# Patient Record
Sex: MALE | Race: WHITE | HISPANIC OR LATINO | Employment: FULL TIME | URBAN - METROPOLITAN AREA
[De-identification: names, ages, dates, MRNs, and addresses within clinical notes are randomized per-mention and may not be internally consistent; named-entity substitution may affect disease eponyms.]

---

## 2023-09-14 ENCOUNTER — NURSE TRIAGE (OUTPATIENT)
Dept: NURSING | Facility: CLINIC | Age: 28
End: 2023-09-14

## 2023-09-14 NOTE — TELEPHONE ENCOUNTER
"Pt reports \"rock climbing two days ago on campus at [employer].\"  \"Had a pretty hard fall.\"  \"Caught myself with my R arm.\"  \"Landed directly on my palm.  \"Shock of wrist and thumb, and all the way up the arm.\"    Applied ice x 24 hours.  Then heat x 24 hours.  Pain not severe.  Has taken 800 mg ibuprofen every 6-to-8 hours.  Able to sleep well overnight.    Injury occurred Sept 12th (48 hours ago).  Wrist and elbow have decreased motion.  \"Significant swelling around elbow.\"  \"Still hurting.\"  Pain involves R forearm (wrist to elbow).    Pt asks about [place] option.  Warm transferred to a  for an appointment now.  [phone] -> reached ortho .   will now check directly with [facility] staff now ....  Advised pt to accept any same-day appt found.  Otherwise should seek walk-in eval at services such as [facility].  Pt agrees to plan.    [facility] Nurse Advisor     Reason for Disposition   Can't move injured arm normally (bend or straighten completely)    Additional Information   Negative: Major bleeding (actively dripping or spurting) that can't be stopped   Negative: Serious injury with multiple fractures (broken bones)   Negative: Sounds like a life-threatening emergency to the triager   Negative: Wound looks infected   Negative: Arm pain from overuse (e.g., sports, lifting, physical work)   Negative: Arm pain not from an injury   Negative: Shoulder injury is main concern   Negative: Elbow injury is main concern   Negative: Hand or wrist injury is main concern   Negative: Bullet wound, stabbed by knife, or other serious penetrating wound   Negative: Looks like a broken bone or dislocated joint (crooked or deformed)   Negative: Can't move injured arm at all   Negative: Bleeding won't stop after 10 minutes of direct pressure (using correct technique)   Negative: Skin is split open or gaping (or length > 1/2 inch or 12 mm)   Negative: Dirt in " the wound and not removed after 15 minutes of scrubbing   Negative: Sounds like a serious injury to the triager   Negative: SEVERE pain    Protocols used: Arm Injury-A-OH

## 2023-09-15 ENCOUNTER — PRE VISIT (OUTPATIENT)
Dept: ORTHOPEDICS | Facility: CLINIC | Age: 28
End: 2023-09-15

## 2023-09-15 ENCOUNTER — OFFICE VISIT (OUTPATIENT)
Dept: ORTHOPEDICS | Facility: CLINIC | Age: 28
End: 2023-09-15
Payer: COMMERCIAL

## 2023-09-15 ENCOUNTER — ANCILLARY PROCEDURE (OUTPATIENT)
Dept: GENERAL RADIOLOGY | Facility: CLINIC | Age: 28
End: 2023-09-15
Attending: FAMILY MEDICINE
Payer: COMMERCIAL

## 2023-09-15 DIAGNOSIS — M79.601 RIGHT ARM PAIN: ICD-10-CM

## 2023-09-15 DIAGNOSIS — M79.601 RIGHT ARM PAIN: Primary | ICD-10-CM

## 2023-09-15 PROCEDURE — 73110 X-RAY EXAM OF WRIST: CPT | Mod: RT | Performed by: RADIOLOGY

## 2023-09-15 PROCEDURE — 99203 OFFICE O/P NEW LOW 30 MIN: CPT | Mod: GC | Performed by: FAMILY MEDICINE

## 2023-09-15 PROCEDURE — 73080 X-RAY EXAM OF ELBOW: CPT | Mod: RT | Performed by: RADIOLOGY

## 2023-09-15 NOTE — TELEPHONE ENCOUNTER
DIAGNOSIS: R wrist pain/R elbow has 90% rom/forearm issue/ortho con    APPOINTMENT DATE: 09/15/23   NOTES STATUS DETAILS   OFFICE NOTE from referring provider N/A Self

## 2023-09-15 NOTE — PROGRESS NOTES
Attending Note:   I have personally examined this patient /images and have reviewed the clinical presentation and progress note with the fellow. I agree with the treatment plan as outlined. The plan was formulated with the fellow on the day of the patient's visit. I have reviewed all imaging with the fellow and agree with the findings in the documentation.     Angy Pang MD, CAQ, CCD  [facility]  Sports Medicine and Bone Health

## 2023-09-15 NOTE — LETTER
9/15/2023      RE: Jere Trammell  [address]  [place] [address]     Dear Colleague,    Thank you for referring your patient, Jere Trammell, to the [facility] SPORTS MEDICINE CLINIC [place]. Please see a copy of my visit note below.      [facility]  Sports Medicine Clinic  Clinics and Surgery Center         SUBJECTIVE       Jere Trammell is a 28 year old male presenting to clinic today with concern of right elbow pain.     Background:   Date of injury: 9/12/23  Duration of symptoms: 4 days  Mechanism of Injury: FOOSH while bouldering, landed directly on right wrist.  Aggravating factors: General use of the wrist  Relieving Factors: Brace  Prior Evaluation: None    Jere explains that he has been bracing his right elbow which has provided some pain relief. Ibuprofen 800mg po tid was used for 48 hours and provided pain relief. Has been applying heat and cold with some improvement. 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 He is left handed. Has has been using his right elbow since the injury.     Denies any pain to the right wrist. Has good ROM and denies any associated bruising. No hx of prior trauma to the site.     PMH, Medications and Allergies were reviewed and updated as needed.    ROS:  As noted above otherwise negative.    There is no problem list on file for this patient.      No current outpatient medications on file.            OBJECTIVE:       Vitals: There were no vitals filed for this visit.  BMI: There is no height or weight on file to calculate BMI.    Gen:  Well nourished and in no acute distress  HEENT: Extraocular movement intact  Neck: Supple  Pulm:  Breathing Comfortably. No increased respiratory effort.  Psych: Euthymic. Appropriately answers questions    MSK:   MSK - Right WRIST/HAND  Inspection: No atrophy nor swelling  Sensation: intact in radial, medial, ulnar distribution   ROM:   Wrist: Full flexion,  extension, radial deviation, ulnar deviation, pronation, supination.  Hand: Full flexion at PIP/DIP, finger abduction/adduction.   Strength: 5/5 in all motions   Bony tenderness:   Wrist: Nttp Carpal bones, Snuffbox:   Hand: Nttp Metacarpals, Phalanges  Tendons: FDS/FDP/Extensor mechanism intact   Other: No nodules palpated in palmar aspect.     Right elbow:   Obvious effusion at the joint extending to mid humerus.  Decreased flexion and extension secondary to pain and swelling. Pain with supination and pronation.   Ecchymosis appreciated at the elbow.   No tenderness to palpation of the olecranon or radial head.     IMAGING: Final results and radiologist's interpretation available in the [facility] health record. Images were reviewed with the patient/family members in the office today.     My personal interpretation of the performed imaging is: an intra-articular fracture of the right radial head appreciated. Fracture is non-displaced. Joint effusion appreciated as there is a anterior and posterior sail sign. No olecranon fracture appreciated.           ASSESSMENT and PLAN:     Jere was seen today for pain.    Diagnoses and all orders for this visit:    Right arm pain  -     XR Elbow Right G/E 3 Views; Future  -     XR Wrist Right G/E 3 Views; Future      Jere presents to clinic today after a fall while boldering. Swelling appreciated at the right elbow and decreased ROM. Xray did show an intra-articular fracture of the right radial head appreciated. Fracture is non-displaced. Joint effusion appreciated as there is a anterior and posterior sail sign. No olecranon fracture appreciated.      Recommendations:   -Sling provided for patient to wear until his next visit on 9/21/2023  -Repeat xray on 9/21/2023   -He can engage in gentle ROM (flexion/extension) but recommend that he not use the right arm or try to supinate of pronate.   -Can use tylenol for pain relief  -Continue icing his elbow, explained that heat may make  swelling worse    Options for treatment and/or follow-up care were reviewed with the patient was actively involved in the decision making process. Patient verbalized understanding and was in agreement with the plan.    The patient was seen by and discussed with attending physician Dr.Suzanne KATIE Pang MD, CAQ, CCD, who agrees with the plan unless otherwise stated.     Rocio Graham  Primary Care Sports Medicine Fellow  [facility]      Attending Note:   I have personally examined this patient /images and have reviewed the clinical presentation and progress note with the fellow. I agree with the treatment plan as outlined. The plan was formulated with the fellow on the day of the patient's visit. I have reviewed all imaging with the fellow and agree with the findings in the documentation.     Angy Pang MD, CAQ, CCD  [facility]  Sports Medicine and Bone Health      Again, thank you for allowing me to participate in the care of your patient.      Sincerely,    Angy Pang MD

## 2023-09-15 NOTE — PROGRESS NOTES
[facility]  Sports Medicine Clinic  Clinics and Surgery Center         SUBJECTIVE       Jere Trammell is a 28 year old male presenting to clinic today with concern of right elbow pain.     Background:   Date of injury: 9/12/23  Duration of symptoms: 4 days  Mechanism of Injury: FOOSH while bouldering, landed directly on right wrist.  Aggravating factors: General use of the wrist  Relieving Factors: Brace  Prior Evaluation: None    Jere explains that he has been bracing his right elbow which has provided some pain relief. Ibuprofen 800mg po tid was used for 48 hours and provided pain relief. Has been applying heat and cold with some improvement. 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 He is left handed. Has has been using his right elbow since the injury.     Denies any pain to the right wrist. Has good ROM and denies any associated bruising. No hx of prior trauma to the site.     PMH, Medications and Allergies were reviewed and updated as needed.    ROS:  As noted above otherwise negative.    There is no problem list on file for this patient.      No current outpatient medications on file.            OBJECTIVE:       Vitals: There were no vitals filed for this visit.  BMI: There is no height or weight on file to calculate BMI.    Gen:  Well nourished and in no acute distress  HEENT: Extraocular movement intact  Neck: Supple  Pulm:  Breathing Comfortably. No increased respiratory effort.  Psych: Euthymic. Appropriately answers questions    MSK:   MSK - Right WRIST/HAND  Inspection: No atrophy nor swelling  Sensation: intact in radial, medial, ulnar distribution   ROM:   Wrist: Full flexion, extension, radial deviation, ulnar deviation, pronation, supination.  Hand: Full flexion at PIP/DIP, finger abduction/adduction.   Strength: 5/5 in all motions   Bony tenderness:   Wrist: Nttp Carpal bones, Snuffbox:   Hand: Nttp Metacarpals, Phalanges  Tendons: FDS/FDP/Extensor  mechanism intact   Other: No nodules palpated in palmar aspect.     Right elbow:   Obvious effusion at the joint extending to mid humerus.  Decreased flexion and extension secondary to pain and swelling. Pain with supination and pronation.   Ecchymosis appreciated at the elbow.   No tenderness to palpation of the olecranon or radial head.     IMAGING: Final results and radiologist's interpretation available in the [facility] health record. Images were reviewed with the patient/family members in the office today.     My personal interpretation of the performed imaging is: an intra-articular fracture of the right radial head appreciated. Fracture is non-displaced. Joint effusion appreciated as there is a anterior and posterior sail sign. No olecranon fracture appreciated.           ASSESSMENT and PLAN:     Jere was seen today for pain.    Diagnoses and all orders for this visit:    Right arm pain  -     XR Elbow Right G/E 3 Views; Future  -     XR Wrist Right G/E 3 Views; Future      Jere presents to clinic today after a fall while boldering. Swelling appreciated at the right elbow and decreased ROM. Xray did show an intra-articular fracture of the right radial head appreciated. Fracture is non-displaced. Joint effusion appreciated as there is a anterior and posterior sail sign. No olecranon fracture appreciated.      Recommendations:   -Sling provided for patient to wear until his next visit on 9/21/2023  -Repeat xray on 9/21/2023   -He can engage in gentle ROM (flexion/extension) but recommend that he not use the right arm or try to supinate of pronate.   -Can use tylenol for pain relief  -Continue icing his elbow, explained that heat may make swelling worse    Options for treatment and/or follow-up care were reviewed with the patient was actively involved in the decision making process. Patient verbalized understanding and was in agreement with the plan.    The patient was seen by and discussed with attending  physician Dr.Suzanne KATIE Pang MD, CAQ, CCD, who agrees with the plan unless otherwise stated.     Rocio Graham  Primary Care Sports Medicine Fellow  [facility]

## 2023-09-18 DIAGNOSIS — S42.401D CLOSED FRACTURE DISLOCATION OF RIGHT ELBOW WITH ROUTINE HEALING, SUBSEQUENT ENCOUNTER: Primary | ICD-10-CM

## 2023-09-21 ENCOUNTER — ANCILLARY PROCEDURE (OUTPATIENT)
Dept: GENERAL RADIOLOGY | Facility: CLINIC | Age: 28
End: 2023-09-21
Attending: STUDENT IN AN ORGANIZED HEALTH CARE EDUCATION/TRAINING PROGRAM
Payer: COMMERCIAL

## 2023-09-21 ENCOUNTER — OFFICE VISIT (OUTPATIENT)
Dept: ORTHOPEDICS | Facility: CLINIC | Age: 28
End: 2023-09-21
Payer: COMMERCIAL

## 2023-09-21 DIAGNOSIS — M65.4 DE QUERVAIN'S TENOSYNOVITIS, RIGHT: ICD-10-CM

## 2023-09-21 DIAGNOSIS — S52.124A CLOSED NONDISPLACED FRACTURE OF HEAD OF RIGHT RADIUS, INITIAL ENCOUNTER: Primary | ICD-10-CM

## 2023-09-21 DIAGNOSIS — S42.401D CLOSED FRACTURE DISLOCATION OF RIGHT ELBOW WITH ROUTINE HEALING, SUBSEQUENT ENCOUNTER: ICD-10-CM

## 2023-09-21 PROCEDURE — 73080 X-RAY EXAM OF ELBOW: CPT | Mod: RT | Performed by: RADIOLOGY

## 2023-09-21 PROCEDURE — 99213 OFFICE O/P EST LOW 20 MIN: CPT | Mod: GC | Performed by: FAMILY MEDICINE

## 2023-09-21 NOTE — LETTER
9/21/2023      RE: Jere Trammell  [address]  [place] [address]     Dear Colleague,    Thank you for referring your patient, Jere Trammell, to the [facility] SPORTS MEDICINE CLINIC [place]. Please see a copy of my visit note below.      [facility]  Sports Medicine Clinic  Clinics and Surgery Center         SUBJECTIVE       Jere Trammell is a 28 year old male presenting to clinic today for follow-up of R radial head fx.     Background:   Date of injury: 9/12/23  Mechanism of Injury: FOOSH while bouldering, landed directly on right wrist.    Since last visit on 9/18, patient has been using compression sleeve and sling unless bathing or relaxing and not moving at home. Occasional ice and tylenol for pain. Motion, pain and swelling have all greatly improved, feeling about 75% back to normal. Pain in elbow is more dull than sharp now, worse with any movement. Also having occasional sharp twinges of pain at wrist which seems like it is separate with certain movements or when loading wrist accidentally. No numbness/tingling.    PMH, Medications and Allergies were reviewed and updated as needed.    ROS:  As noted above otherwise negative.    There is no problem list on file for this patient.      No current outpatient medications on file.            OBJECTIVE:       Vitals: There were no vitals filed for this visit.  BMI: There is no height or weight on file to calculate BMI.    Gen:  Well nourished and in no acute distress  HEENT: Extraocular movement intact  Neck: Supple  Pulm:  Breathing Comfortably. No increased respiratory effort.  Psych: Euthymic. Appropriately answers questions    MSK:   MSK - Right WRIST/HAND/ELBOW  Inspection: Ongoing, but improved effusion of the R elbow  Sensation: intact in radial, medial, ulnar distribution   ROM:   Elbow: Minimally decreased (near full) flexion and extension secondary to pain and swelling. Pain with supination and  pronation.   Wrist: Full flexion, extension, radial deviation, ulnar deviation, pronation, supination. Pain in all directions.  Strength: 4/5 in all motions due to pain  Bony tenderness:   Elbow: No tenderness to palpation of the olecranon or radial head.  Wrist: TTP over 1st extensor tendons, NTTP Carpal bones, Snuffbox  Hand: Nttp Metacarpals, Phalanges  Tendons: FDS/FDP/Extensor mechanism intact    Other: No nodules palpated in palmar aspect.     Narrative & Impression   3 views right elbow radiographs 9/21/2023 1:39 PM     History: Closed fracture dislocation of right elbow with routine  healing, subsequent encounter     Comparison: 9/15/2023     Findings:     AP, oblique and lateral views of the right elbow were obtained.      Redemonstration intra-articular fracture of the radial head, similar  alignment. Fracture line remains visible. Decreased joint effusion  with posterior fat no longer visible.     No substantial degenerative change.                                                                      IMPRESSION:  1. Ongoing healing radial head fracture with persistent visualization  fracture line. Alignment unchanged.  2. Decreased joint effusion.     HOMER HUSAIN             ASSESSMENT and PLAN:     Jere was seen today for follow up.    Diagnoses and all orders for this visit:    Closed nondisplaced fracture of head of right radius, initial encounter    De Quervain's tenosynovitis, right    Jere presents to clinic for follow-up of R radial head fracture sustained on 9/12/23. Swelling, ROM, pain significantly improved. Xray today showed routine healing of non-displaced intra-articular fracture of the right radial head and improved joint effusion.     Recommendations:   -Sling while in public  -start gentle ROM in all directions   -tylenol, ice for pain/ swelling    Follow-up in 3 weeks or sooner if concerns.    Options for treatment and/or follow-up care were reviewed with the patient was  actively involved in the decision making process. Patient verbalized understanding and was in agreement with the plan.    The patient was seen by and discussed with attending physician Dr.Suzanne KATIE Pang MD, CAQ, CCD, who agrees with the plan unless otherwise stated.     Ayush Patel MD   Primary Care Sports Medicine Fellow  [facility]    Attending Note:   I have examined this patient/images and have reviewed the clinical presentation and progress note with the fellow. I agree with the treatment plan as outlined. The plan was formulated with the fellow on the day of the patient's visit. I have reviewed all imaging with the fellow and agree with the findings in the documentation.     Angy Pang MD, CAQ, CCD  [facility]  Sports Medicine and Bone Health      Again, thank you for allowing me to participate in the care of your patient.      Sincerely,    Ayush Patel MD

## 2023-09-21 NOTE — PROGRESS NOTES
Attending Note:   I have examined this patient/images and have reviewed the clinical presentation and progress note with the fellow. I agree with the treatment plan as outlined. The plan was formulated with the fellow on the day of the patient's visit. I have reviewed all imaging with the fellow and agree with the findings in the documentation.     Angy Pang MD, CAQ, CCD  [facility]  Sports Medicine and Bone Health

## 2023-09-21 NOTE — PROGRESS NOTES
[facility]  Sports Medicine Clinic  Clinics and Surgery Center         SUBJECTIVE       Jere Trammell is a 28 year old male presenting to clinic today for follow-up of R radial head fx.     Background:   Date of injury: 9/12/23  Mechanism of Injury: FOOSH while bouldering, landed directly on right wrist.    Since last visit on 9/18, patient has been using compression sleeve and sling unless bathing or relaxing and not moving at home. Occasional ice and tylenol for pain. Motion, pain and swelling have all greatly improved, feeling about 75% back to normal. Pain in elbow is more dull than sharp now, worse with any movement. Also having occasional sharp twinges of pain at wrist which seems like it is separate with certain movements or when loading wrist accidentally. No numbness/tingling.    PMH, Medications and Allergies were reviewed and updated as needed.    ROS:  As noted above otherwise negative.    There is no problem list on file for this patient.      No current outpatient medications on file.            OBJECTIVE:       Vitals: There were no vitals filed for this visit.  BMI: There is no height or weight on file to calculate BMI.    Gen:  Well nourished and in no acute distress  HEENT: Extraocular movement intact  Neck: Supple  Pulm:  Breathing Comfortably. No increased respiratory effort.  Psych: Euthymic. Appropriately answers questions    MSK:   MSK - Right WRIST/HAND/ELBOW  Inspection: Ongoing, but improved effusion of the R elbow  Sensation: intact in radial, medial, ulnar distribution   ROM:   Elbow: Minimally decreased (near full) flexion and extension secondary to pain and swelling. Pain with supination and pronation.   Wrist: Full flexion, extension, radial deviation, ulnar deviation, pronation, supination. Pain in all directions.  Strength: 4/5 in all motions due to pain  Bony tenderness:   Elbow: No tenderness to palpation of the olecranon or radial head.  Wrist: TTP over 1st  extensor tendons, NTTP Carpal bones, Snuffbox  Hand: Nttp Metacarpals, Phalanges  Tendons: FDS/FDP/Extensor mechanism intact    Other: No nodules palpated in palmar aspect.     Narrative & Impression   3 views right elbow radiographs 9/21/2023 1:39 PM     History: Closed fracture dislocation of right elbow with routine  healing, subsequent encounter     Comparison: 9/15/2023     Findings:     AP, oblique and lateral views of the right elbow were obtained.      Redemonstration intra-articular fracture of the radial head, similar  alignment. Fracture line remains visible. Decreased joint effusion  with posterior fat no longer visible.     No substantial degenerative change.                                                                      IMPRESSION:  1. Ongoing healing radial head fracture with persistent visualization  fracture line. Alignment unchanged.  2. Decreased joint effusion.     HOMER HUSAIN             ASSESSMENT and PLAN:     Jere was seen today for follow up.    Diagnoses and all orders for this visit:    Closed nondisplaced fracture of head of right radius, initial encounter    De Quervain's tenosynovitis, right    Jere presents to clinic for follow-up of R radial head fracture sustained on 9/12/23. Swelling, ROM, pain significantly improved. Xray today showed routine healing of non-displaced intra-articular fracture of the right radial head and improved joint effusion.     Recommendations:   -Sling while in public  -start gentle ROM in all directions   -tylenol, ice for pain/ swelling    Follow-up in 3 weeks or sooner if concerns.    Options for treatment and/or follow-up care were reviewed with the patient was actively involved in the decision making process. Patient verbalized understanding and was in agreement with the plan.    The patient was seen by and discussed with attending physician Dr.Suzanne KATIE Pang MD, CAQ, CCD, who agrees with the plan unless otherwise stated.     Ayush  MD Jorge   Primary Care Sports Medicine Fellow  [facility]

## 2023-10-10 DIAGNOSIS — M25.521 RIGHT ELBOW PAIN: Primary | ICD-10-CM

## 2023-10-10 DIAGNOSIS — S42.401D CLOSED FRACTURE DISLOCATION OF RIGHT ELBOW WITH ROUTINE HEALING, SUBSEQUENT ENCOUNTER: ICD-10-CM

## 2023-10-12 ENCOUNTER — ANCILLARY PROCEDURE (OUTPATIENT)
Dept: GENERAL RADIOLOGY | Facility: CLINIC | Age: 28
End: 2023-10-12
Attending: STUDENT IN AN ORGANIZED HEALTH CARE EDUCATION/TRAINING PROGRAM
Payer: COMMERCIAL

## 2023-10-12 ENCOUNTER — OFFICE VISIT (OUTPATIENT)
Dept: ORTHOPEDICS | Facility: CLINIC | Age: 28
End: 2023-10-12
Payer: COMMERCIAL

## 2023-10-12 DIAGNOSIS — M25.521 RIGHT ELBOW PAIN: ICD-10-CM

## 2023-10-12 DIAGNOSIS — S52.124A CLOSED NONDISPLACED FRACTURE OF HEAD OF RIGHT RADIUS, INITIAL ENCOUNTER: Primary | ICD-10-CM

## 2023-10-12 DIAGNOSIS — S42.401D CLOSED FRACTURE DISLOCATION OF RIGHT ELBOW WITH ROUTINE HEALING, SUBSEQUENT ENCOUNTER: ICD-10-CM

## 2023-10-12 PROCEDURE — 99213 OFFICE O/P EST LOW 20 MIN: CPT | Mod: GC | Performed by: FAMILY MEDICINE

## 2023-10-12 PROCEDURE — 73080 X-RAY EXAM OF ELBOW: CPT | Mod: RT | Performed by: RADIOLOGY

## 2023-10-12 NOTE — PROGRESS NOTES
[facility]  Sports Medicine Clinic  Clinics and Surgery Center         SUBJECTIVE       Jere Trammell is a 28 year old male presenting to clinic today for follow-up of R radial head fx.     Background:   Date of injury: 9/12/23  Mechanism of Injury: FOOSH while bouldering, landed directly on right wrist.    Since last visit on 9/21, patient has been using compression sleeve and sling unless bathing or relaxing and not moving at home. Occasional ice, otherwise not needing anything for pain. Motion, pain and swelling have all greatly improved, feeling about 85 to 90% back to normal. Pain in elbow is more dull and only at very extremes of motion.    PMH, Medications and Allergies were reviewed and updated as needed.    ROS:  As noted above otherwise negative.    There is no problem list on file for this patient.      No current outpatient medications on file.            OBJECTIVE:       Vitals: There were no vitals filed for this visit.  BMI: There is no height or weight on file to calculate BMI.    Gen:  Well nourished and in no acute distress  HEENT: Extraocular movement intact  Neck: Supple  Pulm:  Breathing Comfortably. No increased respiratory effort.  Psych: Euthymic. Appropriately answers questions    MSK:   MSK - Right WRIST/HAND/ELBOW  Inspection: mild effusion of the R elbow  Sensation: intact in radial, medial, ulnar distribution   ROM: Elbow/ wrist: Full flexion, extension, supination, pronation with minimal pain at extremes  Strength: 5/5 in all directions with 1/10 pain at extremes of resisted motion  Bony tenderness: 1/10 TTP over radial head  No tenderness to palpation of the olecranon, 1st extensor tendons, Carpal bones, Snuffbox Metacarpals, Phalanges  Tendons: FDS/FDP/Extensor mechanism intact      XRAY:  Narrative & Impression   3 views right elbow radiographs 10/12/2023 4:22 PM     History: AP/LAT/OBL; Right elbow pain; Closed fracture dislocation of  right elbow with routine  healing, subsequent encounter      Comparison: 9/21/2023     Findings:     AP, oblique and lateral views of the right elbow were obtained.      Healing intra-articular fracture of the radial head. Alignment  unchanged with minimal depression of the lateral fracture segment. No  joint effusion.     No substantial degenerative change.     Soft tissue is unremarkable.                                                                      Impression:  Intra-articular radial head fracture in stable alignment.     CHU OROZCO MD (Joe)             ASSESSMENT and PLAN:     Jere was seen today for pain.    Diagnoses and all orders for this visit:    Closed nondisplaced fracture of head of right radius, initial encounter  -     Calcium; Future  -     Vitamin D Deficiency; Future    Jere presents to clinic for follow-up of R radial head fracture sustained on 9/12/23.  Patient is 4.5 weeks from date of injury.  No change in fracture alignment on radiographs today, however somewhat limited evidence of bony healing compared to what we expect at this time.    Recommendations:   -Sling while in public   -gentle ROM  -tylenol, ice for pain/ swelling  -Consider calcium and vitamin D testing to ensure bony healing.  Patient will discuss cost with insurance prior to obtaining lab work.  If fracture is not further along by next visit would insist on this even more as it could contribute to delayed healing..    Follow-up in 2-3 weeks or sooner if concerns.    Options for treatment and/or follow-up care were reviewed with the patient was actively involved in the decision making process. Patient verbalized understanding and was in agreement with the plan.    The patient was seen by and discussed with attending physician Dr.Suzanne KATIE Pang MD, CAQ, CCD, who agrees with the plan unless otherwise stated.     Ayush Patel MD   Primary Care Sports Medicine Fellow  [facility]

## 2023-10-12 NOTE — LETTER
10/12/2023      RE: Jere Trammell  [address]  [place] [address]     Dear Colleague,    Thank you for referring your patient, Jere Trammell, to the [facility] SPORTS MEDICINE CLINIC [place]. Please see a copy of my visit note below.      [facility]  Sports Medicine Clinic  Clinics and Surgery Center         SUBJECTIVE       Jere Trammell is a 28 year old male presenting to clinic today for follow-up of R radial head fx.     Background:   Date of injury: 9/12/23  Mechanism of Injury: FOOSH while bouldering, landed directly on right wrist.    Since last visit on 9/21, patient has been using compression sleeve and sling unless bathing or relaxing and not moving at home. Occasional ice, otherwise not needing anything for pain. Motion, pain and swelling have all greatly improved, feeling about 85 to 90% back to normal. Pain in elbow is more dull and only at very extremes of motion.    PMH, Medications and Allergies were reviewed and updated as needed.    ROS:  As noted above otherwise negative.    There is no problem list on file for this patient.      No current outpatient medications on file.            OBJECTIVE:       Vitals: There were no vitals filed for this visit.  BMI: There is no height or weight on file to calculate BMI.    Gen:  Well nourished and in no acute distress  HEENT: Extraocular movement intact  Neck: Supple  Pulm:  Breathing Comfortably. No increased respiratory effort.  Psych: Euthymic. Appropriately answers questions    MSK:   MSK - Right WRIST/HAND/ELBOW  Inspection: mild effusion of the R elbow  Sensation: intact in radial, medial, ulnar distribution   ROM: Elbow/ wrist: Full flexion, extension, supination, pronation with minimal pain at extremes  Strength: 5/5 in all directions with 1/10 pain at extremes of resisted motion  Bony tenderness: 1/10 TTP over radial head  No tenderness to palpation of the olecranon, 1st extensor tendons,  Carpal bones, Snuffbox Metacarpals, Phalanges  Tendons: FDS/FDP/Extensor mechanism intact      XRAY:  Narrative & Impression   3 views right elbow radiographs 10/12/2023 4:22 PM     History: AP/LAT/OBL; Right elbow pain; Closed fracture dislocation of  right elbow with routine healing, subsequent encounter      Comparison: 9/21/2023     Findings:     AP, oblique and lateral views of the right elbow were obtained.      Healing intra-articular fracture of the radial head. Alignment  unchanged with minimal depression of the lateral fracture segment. No  joint effusion.     No substantial degenerative change.     Soft tissue is unremarkable.                                                                      Impression:  Intra-articular radial head fracture in stable alignment.     CHU (Andrew OROZCO MD             ASSESSMENT and PLAN:     Jere was seen today for pain.    Diagnoses and all orders for this visit:    Closed nondisplaced fracture of head of right radius, initial encounter  -     Calcium; Future  -     Vitamin D Deficiency; Future    Jere presents to clinic for follow-up of R radial head fracture sustained on 9/12/23.  Patient is 4.5 weeks from date of injury.  No change in fracture alignment on radiographs today, however somewhat limited evidence of bony healing compared to what we expect at this time.    Recommendations:   -Sling while in public   -gentle ROM  -tylenol, ice for pain/ swelling  -Consider calcium and vitamin D testing to ensure bony healing.  Patient will discuss cost with insurance prior to obtaining lab work.  If fracture is not further along by next visit would insist on this even more as it could contribute to delayed healing..    Follow-up in 2-3 weeks or sooner if concerns.    Options for treatment and/or follow-up care were reviewed with the patient was actively involved in the decision making process. Patient verbalized understanding and was in agreement with the  plan.    The patient was seen by and discussed with attending physician Dr.Suzanne KATIE Pang MD, CAQ, CCD, who agrees with the plan unless otherwise stated.     Ayush Patel MD   Primary Care Sports Medicine Fellow  [facility]    Attending Note:   I have examined this patient /images and have reviewed the clinical presentation and progress note with the fellow. I agree with the treatment plan as outlined. The plan was formulated with the fellow on the day of the patient's visit. I have reviewed all imaging with the fellow and agree with the findings in the documentation.     Angy Pang MD, CAQ, CCD  [facility]  Sports Medicine and Bone Health      Again, thank you for allowing me to participate in the care of your patient.      Sincerely,    Ayush Patel MD

## 2023-10-13 ENCOUNTER — LAB (OUTPATIENT)
Dept: LAB | Facility: CLINIC | Age: 28
End: 2023-10-13
Payer: COMMERCIAL

## 2023-10-13 DIAGNOSIS — S52.124A CLOSED NONDISPLACED FRACTURE OF HEAD OF RIGHT RADIUS, INITIAL ENCOUNTER: ICD-10-CM

## 2023-10-13 LAB
CALCIUM SERPL-MCNC: 9.3 MG/DL (ref 8.6–10)
VIT D+METAB SERPL-MCNC: 30 NG/ML (ref 20–50)

## 2023-10-13 PROCEDURE — 82306 VITAMIN D 25 HYDROXY: CPT | Performed by: FAMILY MEDICINE

## 2023-10-13 PROCEDURE — 82310 ASSAY OF CALCIUM: CPT | Performed by: PATHOLOGY

## 2023-10-13 PROCEDURE — 36415 COLL VENOUS BLD VENIPUNCTURE: CPT | Performed by: PATHOLOGY

## 2023-10-13 PROCEDURE — 99000 SPECIMEN HANDLING OFFICE-LAB: CPT | Performed by: PATHOLOGY

## 2023-10-14 NOTE — PROGRESS NOTES
Attending Note:   I have examined this patient /images and have reviewed the clinical presentation and progress note with the fellow. I agree with the treatment plan as outlined. The plan was formulated with the fellow on the day of the patient's visit. I have reviewed all imaging with the fellow and agree with the findings in the documentation.     Angy Pang MD, CAQ, CCD  [facility]  Sports Medicine and Bone Health

## 2023-10-22 ENCOUNTER — HEALTH MAINTENANCE LETTER (OUTPATIENT)
Age: 28
End: 2023-10-22

## 2023-10-25 DIAGNOSIS — S52.124A CLOSED NONDISPLACED FRACTURE OF HEAD OF RIGHT RADIUS, INITIAL ENCOUNTER: Primary | ICD-10-CM

## 2023-11-02 ENCOUNTER — OFFICE VISIT (OUTPATIENT)
Dept: ORTHOPEDICS | Facility: CLINIC | Age: 28
End: 2023-11-02
Payer: COMMERCIAL

## 2023-11-02 ENCOUNTER — ANCILLARY PROCEDURE (OUTPATIENT)
Dept: GENERAL RADIOLOGY | Facility: CLINIC | Age: 28
End: 2023-11-02
Attending: STUDENT IN AN ORGANIZED HEALTH CARE EDUCATION/TRAINING PROGRAM
Payer: COMMERCIAL

## 2023-11-02 DIAGNOSIS — S52.124A CLOSED NONDISPLACED FRACTURE OF HEAD OF RIGHT RADIUS, INITIAL ENCOUNTER: ICD-10-CM

## 2023-11-02 DIAGNOSIS — S52.124A CLOSED NONDISPLACED FRACTURE OF HEAD OF RIGHT RADIUS, INITIAL ENCOUNTER: Primary | ICD-10-CM

## 2023-11-02 PROCEDURE — 99213 OFFICE O/P EST LOW 20 MIN: CPT | Mod: GC | Performed by: FAMILY MEDICINE

## 2023-11-02 PROCEDURE — 73080 X-RAY EXAM OF ELBOW: CPT | Mod: RT | Performed by: RADIOLOGY

## 2023-11-02 NOTE — LETTER
11/2/2023      RE: Jere Trammell  [address]  [place] [facility] [address]     Dear Colleague,    Thank you for referring your patient, Jere Trammell, to the [facility] [place] [facility] SPORTS MEDICINE CLINIC [place]. Please see a copy of my visit note below.      [facility]  Sports Medicine Clinic  Clinics and Surgery Center         SUBJECTIVE       Jere Trammlel is a 28 year old male presenting to clinic today for follow-up of R radial head fx.     Background:   Date of injury: 9/12/23  Mechanism of Injury: FOOSH while bouldering, landed directly on right wrist.    Since last visit on 10/12, patient has been doing well and no longer needing compression sleeves or sling while at home.   Not needing anything for pain. Motion, pain and swelling have all greatly improved, feeling essentially back to normal but has not tested this with normal activities yet.    PMH, Medications and Allergies were reviewed and updated as needed.    ROS:  As noted above otherwise negative.    There is no problem list on file for this patient.      No current outpatient medications on file.            OBJECTIVE:       Vitals: There were no vitals filed for this visit.  BMI: There is no height or weight on file to calculate BMI.    Gen:  Well nourished and in no acute distress  HEENT: Extraocular movement intact  Neck: Supple  Pulm:  Breathing Comfortably. No increased respiratory effort.  Psych: Euthymic. Appropriately answers questions    MSK:   MSK - Right WRIST/HAND/ELBOW  Inspection: minimal effusion of the R elbow, improved from prior  Sensation: intact in radial, medial, ulnar distribution   ROM: Elbow/ wrist: Full flexion, extension, supination, pronation with no pain at extremes  Strength: 5/5 in all directions with minimal discomfort on resisted pronation  Bony tenderness: No tenderness palpation over the entire elbow including the radial head  Tendons: FDS/FDP/Extensor mechanism intact      Recent  Results (from the past 720 hour(s))   Calcium    Collection Time: 10/13/23  4:39 PM   Result Value Ref Range    Calcium 9.3 8.6 - 10.0 mg/dL   Vitamin D Deficiency    Collection Time: 10/13/23  4:39 PM   Result Value Ref Range    Vitamin D, Total (25-Hydroxy) 30 20 - 50 ng/mL         XRAY:  Narrative & Impression   3 views right elbow radiographs 11/2/2023 3:48 PM     History: Closed nondisplaced fracture of head of right radius, initial  encounter      Comparison: 10/12/2023, 9/21/2023, 9/15/2023     Findings:     AP, oblique and lateral views of the right elbow were obtained.      Redemonstration of intra-articular fracture of the radial head and  neck. Approximately 1 mm of depression of the lateral/radial sided  fracture fragments, similar to prior. Decreased fracture lucency.  Small joint effusion.     No substantial degenerative change.     Soft tissue is unremarkable.                                                                      Impression:  Healing intra-articular proximal radial fracture in stable alignment.            ASSESSMENT and PLAN:     Jere was seen today for follow up.    Diagnoses and all orders for this visit:    Closed nondisplaced fracture of head of right radius, initial encounter      Jere presents to clinic for follow-up of R radial head fracture sustained on 9/12/23.  Patient is 7.5 weeks from date of injury.  Symptomatically significantly improved with reassuring exam.  No change in fracture alignment on radiographs today, and has some evidence of bony healing.  We suspect this is at this point patient's fracture appears to be stable and can progress back to activities as tolerated   With no need for additional follow-up unless there are further questions or patient is unable to get back to normal activities.    Options for treatment and/or follow-up care were reviewed with the patient was actively involved in the decision making process. Patient verbalized understanding and was  in agreement with the plan.    The patient was seen by and discussed with attending physician Dr.Suzanne KATIE Pang MD, CAQ, CCD, who agrees with the plan unless otherwise stated.     Ayush Patel MD   Primary Care Sports Medicine Fellow  [facility]    Attending Note:   I have  examined this patient/images and have reviewed the clinical presentation and progress note with the fellow. I agree with the treatment plan as outlined. The plan was formulated with the fellow on the day of the patient's visit. I have reviewed all imaging with the fellow and agree with the findings in the documentation.     Angy Pang MD, CAQ, CCD  [facility]  Sports Medicine and Bone Health      Again, thank you for allowing me to participate in the care of your patient.      Sincerely,    Ayush Patel MD

## 2023-11-04 NOTE — PROGRESS NOTES
[facility]  Sports Medicine Clinic  Clinics and Surgery Center         SUBJECTIVE       Jere Trammell is a 28 year old male presenting to clinic today for follow-up of R radial head fx.     Background:   Date of injury: 9/12/23  Mechanism of Injury: FOOSH while bouldering, landed directly on right wrist.    Since last visit on 10/12, patient has been doing well and no longer needing compression sleeves or sling while at home.   Not needing anything for pain. Motion, pain and swelling have all greatly improved, feeling essentially back to normal but has not tested this with normal activities yet.    PMH, Medications and Allergies were reviewed and updated as needed.    ROS:  As noted above otherwise negative.    There is no problem list on file for this patient.      No current outpatient medications on file.            OBJECTIVE:       Vitals: There were no vitals filed for this visit.  BMI: There is no height or weight on file to calculate BMI.    Gen:  Well nourished and in no acute distress  HEENT: Extraocular movement intact  Neck: Supple  Pulm:  Breathing Comfortably. No increased respiratory effort.  Psych: Euthymic. Appropriately answers questions    MSK:   MSK - Right WRIST/HAND/ELBOW  Inspection: minimal effusion of the R elbow, improved from prior  Sensation: intact in radial, medial, ulnar distribution   ROM: Elbow/ wrist: Full flexion, extension, supination, pronation with no pain at extremes  Strength: 5/5 in all directions with minimal discomfort on resisted pronation  Bony tenderness: No tenderness palpation over the entire elbow including the radial head  Tendons: FDS/FDP/Extensor mechanism intact      Recent Results (from the past 720 hour(s))   Calcium    Collection Time: 10/13/23  4:39 PM   Result Value Ref Range    Calcium 9.3 8.6 - 10.0 mg/dL   Vitamin D Deficiency    Collection Time: 10/13/23  4:39 PM   Result Value Ref Range    Vitamin D, Total (25-Hydroxy) 30 20 - 50 ng/mL          XRAY:  Narrative & Impression   3 views right elbow radiographs 11/2/2023 3:48 PM     History: Closed nondisplaced fracture of head of right radius, initial  encounter      Comparison: 10/12/2023, 9/21/2023, 9/15/2023     Findings:     AP, oblique and lateral views of the right elbow were obtained.      Redemonstration of intra-articular fracture of the radial head and  neck. Approximately 1 mm of depression of the lateral/radial sided  fracture fragments, similar to prior. Decreased fracture lucency.  Small joint effusion.     No substantial degenerative change.     Soft tissue is unremarkable.                                                                      Impression:  Healing intra-articular proximal radial fracture in stable alignment.            ASSESSMENT and PLAN:     Jere was seen today for follow up.    Diagnoses and all orders for this visit:    Closed nondisplaced fracture of head of right radius, initial encounter      Jere presents to clinic for follow-up of R radial head fracture sustained on 9/12/23.  Patient is 7.5 weeks from date of injury.  Symptomatically significantly improved with reassuring exam.  No change in fracture alignment on radiographs today, and has some evidence of bony healing.  We suspect this is at this point patient's fracture appears to be stable and can progress back to activities as tolerated   With no need for additional follow-up unless there are further questions or patient is unable to get back to normal activities.    Options for treatment and/or follow-up care were reviewed with the patient was actively involved in the decision making process. Patient verbalized understanding and was in agreement with the plan.    The patient was seen by and discussed with attending physician Dr.Suzanne KATIE Pang MD, CAQ, CCD, who agrees with the plan unless otherwise stated.     Ayush Patel MD   Primary Care Sports Medicine Fellow  [facility]

## 2023-11-06 NOTE — PROGRESS NOTES
Attending Note:   I have  examined this patient/images and have reviewed the clinical presentation and progress note with the fellow. I agree with the treatment plan as outlined. The plan was formulated with the fellow on the day of the patient's visit. I have reviewed all imaging with the fellow and agree with the findings in the documentation.     Angy Pang MD, CAQ, CCD  [facility]  Sports Medicine and Bone Health

## 2024-12-15 ENCOUNTER — HEALTH MAINTENANCE LETTER (OUTPATIENT)
Age: 29
End: 2024-12-15